# Patient Record
(demographics unavailable — no encounter records)

---

## 2025-03-24 NOTE — CARDIOLOGY SUMMARY
[de-identified] : Sinus Rhythm    [de-identified] : 8/2/24  normal LV function.  [de-identified] : 7/22/24 CT cor: normal cors 3.9 cm Ao

## 2025-03-24 NOTE — HISTORY OF PRESENT ILLNESS
[FreeTextEntry1] : 44 year old man with a history HTN, gout, psoriatic arthritis, proteinuria presents follow-up visit.   Since his last visit, he is now pending a labral hip repair. in the near future.  He denies any significant chest pain and dyspnea,  He will continue denies any palpitations, PND, orthopnea, lower extremity edema, near syncope, syncope, stroke-like symptoms. He denies any blurry vision, headaches.  His exercise tolerance is currently limited by the hip pain.  He could not tolerate the HCTZ 2/2 hypotension and gout.   He is currently seeing a rheumatologist.

## 2025-03-24 NOTE — DISCUSSION/SUMMARY
[FreeTextEntry1] : 44 year man with a history as listed presents for a followup visit.  Patrick his blood pressure is better controlled. He was not able to tolerate the HCTZ. He will continue Olmesartan 40 mg daily. If his BP is elevated would start Norvasc 2.5mg Qday.   He denies any anginal symptoms. Clinically he is euvolemic on exam. His EKG did not reveal any significant ischemic changes.   He currently has no active cardiac conditions. He may proceed with the planned low to intermediate risk surgery without any further cardiac workup. Routine hemodynamic monitoring is suggested during the procedure.   He has pervasive fatigue. Will get an HST.  He should speak to rheum about a biologic  Exercise and diet counseling was performed in order to reduce her future cardiovascular risk.  He will followup with me in 3 months or sooner if necessary.  [EKG obtained to assist in diagnosis and management of assessed problem(s)] : EKG obtained to assist in diagnosis and management of assessed problem(s)

## 2025-05-06 NOTE — HISTORY OF PRESENT ILLNESS
[Work related] : work related [de-identified] : ISREAL ANDINO is a 44 year male here with RT  hip pain.  Pain has been present for about a year and is located  anteriorly,  laterally,  posterior   Injury - he was walking down the stairs at work, felt a "pop" in the knee and fell on his RT hip .  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

## 2025-05-06 NOTE — IMAGING
[Right] : right hip with pelvis [All Views] : anteroposterior, lateral [Moderate arthritis (Tonnis Grade 2)] : Moderate arthritis (Tonnis Grade 2) [de-identified] : General: NAD, A&Ox3 Gait: Non-antalgic, no Trendelenburg Foot Progression: neutral Knee Progression: neutral   R Hip Exam: Pain with Log Roll - negative Flexion: 110 Pain with Hyperflexion - yes FADIR - pos CONNIE - neg Extension: 20 Flexion Contracture - none Prone Apprehension Test - neg Prone Rotation Test - symmetric Ischial tenderness - none Trochanteric tenderness - none   Abduction - 4 /5, pain - yes Adduction - 5 /5 Supine resisted SLR - 5 /5, pain - no Seated resisted hip flexion - 5 /5, pain - no Dorsiflexion 5/5 Plantarflexion 5/5 EHL 5/5 DP/PT 2+, foot is warm and well perfused        Leg Lengths: symmetric

## 2025-05-06 NOTE — DISCUSSION/SUMMARY
[de-identified] : R hip impingement and osteoarthritis, large acetabular cyst formation on MRI, areas of full thickness cartilage loss present  Has failed conservative management with PT and NSAIDs He deferred injections Discussed surgery in the form of hip scope vs total hip.  Based  on full thickness cartilage loss on my MRI read and acetabular cyst formation I do not think he would do well with hip arthroscopy and is likely to have persistent pain and require DANNY.  Discussed DANNY and revision risk given young age.  Would like to set up telephone visit with wife to further discuss.  All questions answered, agreeable with plan.

## 2025-05-06 NOTE — DATA REVIEWED
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

## 2025-07-01 NOTE — DISCUSSION/SUMMARY
[FreeTextEntry1] : 44 year man with a history as listed presents for a followup visit.  Patrick his blood pressure is better controlled. He was not able to tolerate the HCTZ. He will continue Olmesartan 40 mg daily.  I will add on the Norvasc 2.5mg Qday. He will try to maintain a BP log at home. Reducing dietary salt intake advised.  He denies any anginal symptoms. Clinically he is euvolemic on exam. His EKG did not reveal any significant ischemic changes.   He currently has no active cardiac conditions. He may proceed with the planned intermediate risk surgery without any further cardiac workup. Routine hemodynamic monitoring is suggested during the procedure.   He has pervasive fatigue.   He should speak to rheum about a biologic  Exercise and diet counseling was performed in order to reduce her future cardiovascular risk.  He will followup with me in 6 months or sooner if necessary.   [EKG obtained to assist in diagnosis and management of assessed problem(s)] : EKG obtained to assist in diagnosis and management of assessed problem(s)

## 2025-07-01 NOTE — CARDIOLOGY SUMMARY
[de-identified] : Sinus Rhythm    [de-identified] : 8/2/24  normal LV function.  [de-identified] : 7/22/24 CT cor: normal cors 3.9 cm Ao

## 2025-07-01 NOTE — HISTORY OF PRESENT ILLNESS
[FreeTextEntry1] : 44 year old man with a history HTN, gout, psoriatic arthritis, proteinuria presents follow-up visit.   Since his last visit, he is now pending today hip replacement. He also started on TRT.  He denies any significant chest pain and dyspnea, He will continue denies any palpitations, PND, orthopnea, lower extremity edema, near syncope, syncope, stroke-like symptoms. He denies any blurry vision, headaches.  His exercise tolerance is currently limited by the hip pain.   He is currently seeing a rheumatologist.

## 2025-07-14 NOTE — IMAGING
[Right] : right hip with pelvis [All Views] : anteroposterior, lateral [Moderate arthritis (Tonnis Grade 2)] : Moderate arthritis (Tonnis Grade 2) [de-identified] : General: NAD, A&Ox3 Gait: Non-antalgic, no Trendelenburg Foot Progression: neutral Knee Progression: neutral   R Hip Exam: Pain with Log Roll - negative Flexion: 110 Pain with Hyperflexion - yes FADIR - pos CONNIE - neg Extension: 20 Flexion Contracture - none Prone Apprehension Test - neg Prone Rotation Test - symmetric Ischial tenderness - none Trochanteric tenderness - none   Abduction - 4 /5, pain - yes Adduction - 5 /5 Supine resisted SLR - 5 /5, pain - no Seated resisted hip flexion - 5 /5, pain - no Dorsiflexion 5/5 Plantarflexion 5/5 EHL 5/5 DP/PT 2+, foot is warm and well perfused        Leg Lengths: symmetric

## 2025-07-14 NOTE — DISCUSSION/SUMMARY
[de-identified] : R hip impingement and osteoarthritis, large acetabular cyst formation on MRI, areas of full thickness cartilage loss present  Has failed conservative management with PT and NSAIDs He deferred injections Planning for DANNY in 4 weeks Tramadol script Flexeril script Continue NSAIDs Offered MDP, states he broke out in a rash last time Notes he has a broken crown but states no infection, getting it fixed Friday Discussed no dental work within 2 weeks before surgery

## 2025-07-14 NOTE — HISTORY OF PRESENT ILLNESS
[Work related] : work related [de-identified] : 07/14/2025 follow up for right hip. reported no changes with pain. no pt at this time. sx was scheduled for 08.6.25.    ISREAL ANDINO is a 44 year male here with RT  hip pain.  Pain has been present for about a year and is located  anteriorly,  laterally,  posterior Injury - he was walking down the stairs at work, felt a "pop" in the knee and fell on his RT hip .  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

## 2025-07-24 NOTE — DISCUSSION/SUMMARY
[de-identified] : R hip impingement and osteoarthritis, large acetabular cyst formation on MRI, areas of full thickness cartilage loss present, here with foot pain after syncopal episode  Getting cards clearance this afternoon 2/2 syncopal episode, needs notes sent to [facility] or [facility] w/o fracture Continue post op shoe till pain improves then may wean planning for DANNY 8/6 All questions answered, agreeable with plan

## 2025-07-24 NOTE — PHYSICAL EXAM
[Right] : right foot and ankle [NL (40)] : plantar flexion 40 degrees [NL 30)] : inversion 30 degrees [NL (20)] : eversion 20 degrees [2+] : posterior tibialis pulse: 2+ [] : no erythema

## 2025-07-24 NOTE — DISCUSSION/SUMMARY
[FreeTextEntry1] : 44 year man with a history as listed presents for a followup visit.  Patrick is now status post hospitalization for syncope.  This was likely in the setting of increased vagal tone from severe pain and dehydration from poor p.o. intake that day.  His blood pressure today is controlled.  He will continue with olmesartan 40 mg daily.  He will stay off of the amlodipine 2.5 mg for now.  Encouraged him to increase his p.o. hydration.  He can get an echocardiogram as routine.  He denies any anginal symptoms. Clinically he is euvolemic on exam. His EKG did not reveal any significant ischemic changes.   He currently has no active cardiac conditions. He may proceed with the planned intermediate risk surgery without any further cardiac workup. Routine hemodynamic monitoring is suggested during the procedure.   He has pervasive fatigue.   He should speak to rheum about a biologic  Exercise and diet counseling was performed in order to reduce her future cardiovascular risk.  He will followup with me in 6 months or sooner if necessary.   [EKG obtained to assist in diagnosis and management of assessed problem(s)] : EKG obtained to assist in diagnosis and management of assessed problem(s)

## 2025-07-24 NOTE — IMAGING
[Right] : right hip with pelvis [All Views] : anteroposterior, lateral [Moderate arthritis (Tonnis Grade 2)] : Moderate arthritis (Tonnis Grade 2) [de-identified] : General: NAD, A&Ox3 Gait: Non-antalgic, no Trendelenburg Foot Progression: neutral Knee Progression: neutral   R Hip Exam: Pain with Log Roll - negative Flexion: 110 Pain with Hyperflexion - yes FADIR - pos CONNIE - neg Extension: 20 Flexion Contracture - none Prone Apprehension Test - neg Prone Rotation Test - symmetric Ischial tenderness - none Trochanteric tenderness - none   Abduction - 4 /5, pain - yes Adduction - 5 /5 Supine resisted SLR - 5 /5, pain - no Seated resisted hip flexion - 5 /5, pain - no Dorsiflexion 5/5 Plantarflexion 5/5 EHL 5/5 DP/PT 2+, foot is warm and well perfused        Leg Lengths: symmetric    [There are no fractures, subluxations or dislocations. No significant abnormalities are seen] : There are no fractures, subluxations or dislocations. No significant abnormalities are seen

## 2025-07-24 NOTE — HISTORY OF PRESENT ILLNESS
[FreeTextEntry1] : 44 year old man with a history HTN, gout, psoriatic arthritis, proteinuria presents follow-up visit.   Since his last visit, he had a syncopal episode 7/15. He was having severe hip pain when he came home from work. He had not eaten much during the day. He was sitting on his bed and then he got up and had LOC. EMS called and was found to be hypotensive. He went to [facility] ED and found to have vasovagal syncope. I personally reviewed all of the hospital records available to me at this time, which included but are not limited to the discharge summary, labs and imaging reports.   He is now pending  hip replacement.  He is still in significant pain.  He denies any further near-syncope or syncope.  He stopped his Norvasc since his hospitalization.  He denies any significant chest pain and dyspnea, He will continue denies any palpitations, PND, orthopnea, lower extremity edema  stroke-like symptoms.   He is currently seeing a rheumatologist.

## 2025-07-24 NOTE — HISTORY OF PRESENT ILLNESS
[Work related] : work related [de-identified] : 7/24/25: Follow up right hip pain, he fell 7/15/25 , had an episode of syncopy, hurt right big toe, no confirmed fracture, discuss upcoming surgery on 8/6/25 07/14/2025 follow up for right hip. reported no changes with pain. no pt at this time. sx was scheduled for 08.6.25.    ISREAL ANDINO is a 44 year male here with RT  hip pain.  Pain has been present for about a year and is located  anteriorly,  laterally,  posterior Injury - he was walking down the stairs at work, felt a "pop" in the knee and fell on his RT hip .  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

## 2025-07-24 NOTE — CARDIOLOGY SUMMARY
[de-identified] : Sinus Rhythm    [de-identified] : 8/2/24  normal LV function.  [de-identified] : 7/22/24 CT cor: normal cors 3.9 cm Ao